# Patient Record
Sex: FEMALE | Race: WHITE
[De-identification: names, ages, dates, MRNs, and addresses within clinical notes are randomized per-mention and may not be internally consistent; named-entity substitution may affect disease eponyms.]

---

## 2017-06-18 ENCOUNTER — HOSPITAL ENCOUNTER (EMERGENCY)
Dept: HOSPITAL 47 - EC | Age: 22
Discharge: HOME | End: 2017-06-18
Payer: COMMERCIAL

## 2017-06-18 VITALS
TEMPERATURE: 99.3 F | DIASTOLIC BLOOD PRESSURE: 85 MMHG | HEART RATE: 88 BPM | RESPIRATION RATE: 18 BRPM | SYSTOLIC BLOOD PRESSURE: 122 MMHG

## 2017-06-18 DIAGNOSIS — S90.32XA: Primary | ICD-10-CM

## 2017-06-18 DIAGNOSIS — Z87.891: ICD-10-CM
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
## 2017-06-18 DIAGNOSIS — Z91.011: ICD-10-CM

## 2017-06-18 PROCEDURE — 99283 EMERGENCY DEPT VISIT LOW MDM: CPT

## 2017-06-18 NOTE — XR
EXAM:

  XR Left Foot Complete, 3 or More Views

 

CLINICAL HISTORY:

  Reason: Pain

 

TECHNIQUE:

  Frontal, lateral, and oblique views of the left foot.

 

COMPARISON:

  No relevant prior studies available.

 

FINDINGS:

  Bones/joints:  Unremarkable.  No acute fracture.  No dislocation.

  Soft tissues:  Unremarkable.  No radiopaque foreign body.

 

IMPRESSION:     

  Normal left foot x-rays.

## 2017-06-18 NOTE — ED
Lower Extremity Injury HPI





- General


Chief Complaint: Extremity Injury, Lower


Stated Complaint: L foot injury


Time Seen by Provider: 17 21:42


Source: patient


Mode of arrival: wheelchair


Limitations: no limitations





- History of Present Illness


Initial Comments: 





's patient is a 22-year-old woman presenting to be a viral for left foot pain 

after she fell on the stairs and her foot struck the wall.


MD Complaint: foot injury


Onset/Timin


-: hour(s)


Injury: Foot: Left


Type of Injury: blunt


Place: home


Severity: moderate


Improves With: nothing


Worsens With: weight bearing


Context: fall


Associated Symptoms: snap/pop sensation, able to partially bear weight





- Related Data


 Previous Rx's











 Medication  Instructions  Recorded


 


Acetaminophen-Codeine 300-30mg 1 tab PO Q4H PRN #15 tablet 17





[Tylenol w/codeine #3]  


 


Ibuprofen [Motrin] 600 mg PO Q8HR PRN #20 tab 17











 Allergies











Allergy/AdvReac Type Severity Reaction Status Date / Time


 


milk AdvReac  Unknown Verified 16 10:19














Review of Systems


ROS Statement: 


Those systems with pertinent positive or pertinent negative responses have been 

documented in the HPI.





ROS Other: All systems not noted in ROS Statement are negative.


Constitutional: Denies: fever, chills


Respiratory: Denies: cough, dyspnea


Cardiovascular: Denies: chest pain, palpitations


Musculoskeletal: Reports: as per HPI, arthralgia


Skin: Denies: lesions


Neurological: Denies: weakness, numbness, paresthesias





Past Medical History


Past Medical History: Asthma


Additional Past Medical History / Comment(s): migraines


History of Any Multi-Drug Resistant Organisms: None Reported


Past Surgical History: Adenoidectomy, Tonsillectomy


Past Psychological History: Anxiety


Smoking Status: Former smoker


Past Alcohol Use History: Daily


Past Drug Use History: None Reported





General Exam


Limitations: no limitations


General appearance: alert, in no apparent distress


Extremities exam: Present: full ROM, tenderness, normal capillary refill, other 

(Patient has a very minimal amount of soft tissue swelling to the dorsum of the 

left forefoot over the fourth and fifth metatarsals.  There is no tenderness to 

palpation of the base of the fifth metatarsal.).  Absent: pedal edema, calf 

tenderness


Skin exam: Present: warm, dry, intact, normal color.  Absent: rash





Course


 Vital Signs











  17





  21:34


 


Temperature 99.3 F


 


Pulse Rate 88


 


Respiratory 18





Rate 


 


Blood Pressure 122/85


 


O2 Sat by Pulse 97





Oximetry 














Disposition


Clinical Impression: 


 Foot contusion





Disposition: HOME SELF-CARE


Condition: Good


Instructions:  Foot Contusion (ED)


Prescriptions: 


Acetaminophen-Codeine 300-30mg [Tylenol w/codeine #3] 1 tab PO Q4H PRN #15 

tablet


 PRN Reason: Pain


Ibuprofen [Motrin] 600 mg PO Q8HR PRN #20 tab


 PRN Reason: Pain


Referrals: 


None,Stated [Primary Care Provider] - 1-2 days

## 2018-07-24 ENCOUNTER — HOSPITAL ENCOUNTER (EMERGENCY)
Dept: HOSPITAL 47 - EC | Age: 23
Discharge: HOME | End: 2018-07-24
Payer: COMMERCIAL

## 2018-07-24 VITALS
HEART RATE: 74 BPM | RESPIRATION RATE: 18 BRPM | TEMPERATURE: 98.2 F | SYSTOLIC BLOOD PRESSURE: 119 MMHG | DIASTOLIC BLOOD PRESSURE: 72 MMHG

## 2018-07-24 DIAGNOSIS — R51: ICD-10-CM

## 2018-07-24 DIAGNOSIS — Z86.69: ICD-10-CM

## 2018-07-24 DIAGNOSIS — Z87.891: ICD-10-CM

## 2018-07-24 DIAGNOSIS — Z91.011: ICD-10-CM

## 2018-07-24 DIAGNOSIS — R11.2: Primary | ICD-10-CM

## 2018-07-24 LAB
ALBUMIN SERPL-MCNC: 4.3 G/DL (ref 3.5–5)
ALP SERPL-CCNC: 62 U/L (ref 38–126)
ALT SERPL-CCNC: 33 U/L (ref 9–52)
ANION GAP SERPL CALC-SCNC: 12 MMOL/L
AST SERPL-CCNC: 24 U/L (ref 14–36)
BASOPHILS # BLD AUTO: 0 K/UL (ref 0–0.2)
BASOPHILS NFR BLD AUTO: 0 %
BUN SERPL-SCNC: 5 MG/DL (ref 7–17)
CALCIUM SPEC-MCNC: 9.3 MG/DL (ref 8.4–10.2)
CHLORIDE SERPL-SCNC: 103 MMOL/L (ref 98–107)
CO2 SERPL-SCNC: 22 MMOL/L (ref 22–30)
EOSINOPHIL # BLD AUTO: 0.1 K/UL (ref 0–0.7)
EOSINOPHIL NFR BLD AUTO: 1 %
ERYTHROCYTE [DISTWIDTH] IN BLOOD BY AUTOMATED COUNT: 5.5 M/UL (ref 3.8–5.4)
ERYTHROCYTE [DISTWIDTH] IN BLOOD: 12.7 % (ref 11.5–15.5)
GLUCOSE SERPL-MCNC: 90 MG/DL (ref 74–99)
HCT VFR BLD AUTO: 50.7 % (ref 34–46)
HGB BLD-MCNC: 16.8 GM/DL (ref 11.4–16)
KETONES UR QL STRIP.AUTO: (no result)
LYMPHOCYTES # SPEC AUTO: 1.1 K/UL (ref 1–4.8)
LYMPHOCYTES NFR SPEC AUTO: 9 %
MCH RBC QN AUTO: 30.5 PG (ref 25–35)
MCHC RBC AUTO-ENTMCNC: 33.1 G/DL (ref 31–37)
MCV RBC AUTO: 92.2 FL (ref 80–100)
MONOCYTES # BLD AUTO: 0.7 K/UL (ref 0–1)
MONOCYTES NFR BLD AUTO: 6 %
NEUTROPHILS # BLD AUTO: 10.4 K/UL (ref 1.3–7.7)
NEUTROPHILS NFR BLD AUTO: 83 %
PH UR: 6.5 [PH] (ref 5–8)
PLATELET # BLD AUTO: 273 K/UL (ref 150–450)
POTASSIUM SERPL-SCNC: 4.1 MMOL/L (ref 3.5–5.1)
PROT SERPL-MCNC: 6.5 G/DL (ref 6.3–8.2)
SODIUM SERPL-SCNC: 137 MMOL/L (ref 137–145)
SP GR UR: 1.02 (ref 1–1.03)
UROBILINOGEN UR QL STRIP: <2 MG/DL (ref ?–2)
WBC # BLD AUTO: 12.5 K/UL (ref 3.8–10.6)

## 2018-07-24 PROCEDURE — 36415 COLL VENOUS BLD VENIPUNCTURE: CPT

## 2018-07-24 PROCEDURE — 96374 THER/PROPH/DIAG INJ IV PUSH: CPT

## 2018-07-24 PROCEDURE — 80053 COMPREHEN METABOLIC PANEL: CPT

## 2018-07-24 PROCEDURE — 85025 COMPLETE CBC W/AUTO DIFF WBC: CPT

## 2018-07-24 PROCEDURE — 81003 URINALYSIS AUTO W/O SCOPE: CPT

## 2018-07-24 PROCEDURE — 99284 EMERGENCY DEPT VISIT MOD MDM: CPT

## 2018-07-24 PROCEDURE — 96361 HYDRATE IV INFUSION ADD-ON: CPT

## 2018-07-24 PROCEDURE — 81025 URINE PREGNANCY TEST: CPT

## 2018-07-24 PROCEDURE — 96375 TX/PRO/DX INJ NEW DRUG ADDON: CPT

## 2019-03-20 NOTE — XR
EXAMINATION TYPE: XR chest 2V

 

DATE OF EXAM: 3/20/2019

 

COMPARISON: 11/19/2014

 

HISTORY: Chest pain

 

TECHNIQUE:  Frontal and lateral views of the chest are obtained.

 

FINDINGS:  

 

There is no focal air space opacity.

 

No evidence for pneumothorax.  No pleural effusion.

 

The cardiac silhouette size is within normal limits.

 

The osseous structures are grossly intact.

 

IMPRESSION:  

 

1.  No acute cardiopulmonary process.

## 2019-03-20 NOTE — ED
URI HPI





- General


Chief Complaint: Upper Respiratory Infection


Stated Complaint: Alcohol withdrawal


Time Seen by Provider: 03/20/19 10:42


Source: patient, RN notes reviewed


Mode of arrival: ambulatory


Limitations: no limitations





- History of Present Illness


Initial Comments: 





23-year-old female presents emergency Department with fever cough congestion.  

Patient states that she does not feel well.  Patient states has been present for

last 4-5 days.  Patient felt that she just had a cold and was seen at [facility] was sent here for further evaluation.  Patient states she mentioned to 

[facility] that she stop drinking 6 days ago he felt that she may be having 

withdrawal symptoms.  Patient states she has slight nausea though this is 

resolved no vomiting.  Denies any shaking.  Patient denies any chest pain or 

shortness of breath.





- Related Data


                                Home Medications











 Medication  Instructions  Recorded  Confirmed


 


Ascorbic Acid [Vitamin C] 1,000 mg PO DAILY 03/20/19 03/20/19


 


Aspirin-Acet-Caff [id number] 1 tab PO Q8H 03/20/19 03/20/19





[Excedrin]   


 


Ibuprofen [Motrin Ib] 200 mg PO Q8HR 03/20/19 03/20/19








                                  Previous Rx's











 Medication  Instructions  Recorded


 


Amoxicillin 875 mg PO Q12HR #20 tablet 03/20/19


 


LORazepam [Ativan] 1 mg PO TID 3 Days #9 tab 03/20/19











                                    Allergies











Allergy/AdvReac Type Severity Reaction Status Date / Time


 


milk AdvReac  Unknown Verified 03/20/19 10:09














Review of Systems


ROS Statement: 


Those systems with pertinent positive or pertinent negative responses have been 

documented in the HPI.





ROS Other: All systems not noted in ROS Statement are negative.





Past Medical History


Past Medical History: Asthma, GERD/Reflux


Additional Past Medical History / Comment(s): migraines, pneumomediastinum 2014.

  Pt has a history of drug and alcohol abuse since age 15, (whiskey, crystal 

meth, marijuana) pt went to rehab jan 2017, clean since that time per pt.


History of Any Multi-Drug Resistant Organisms: None Reported


Past Surgical History: Adenoidectomy, Tonsillectomy


Past Anesthesia/Blood Transfusion Reactions: No Reported Reaction, Motion 

Sickness


Past Psychological History: Anxiety, Panic Disorder


Smoking Status: Former smoker


Past Alcohol Use History: Abuse


Past Drug Use History: Marijuana





- Past Family History


  ** Father


Family Medical History: No Reported History


Additional Family Medical History / Comment(s): FATHER IS HEALTHY.





  ** Mother


History Unknown: Yes


Family Medical History: Unable to Obtain


Additional Family Medical History / Comment(s): PT STATES SHE DOES NOT KNOW HER 

MOTHERS HISTORY.





General Exam


Limitations: no limitations


General appearance: alert, in no apparent distress


Head exam: Present: atraumatic, normocephalic, normal inspection


Eye exam: Present: normal appearance, PERRL, EOMI.  Absent: scleral icterus, 

conjunctival injection, periorbital swelling


ENT exam: Present: mucous membranes moist, TM's normal bilaterally.  Absent: 

normal exam (Sinus tenderness), normal oropharynx (Postnasal drainage)


Neck exam: Present: normal inspection, full ROM.  Absent: tenderness, 

meningismus, lymphadenopathy


Respiratory exam: Present: normal lung sounds bilaterally.  Absent: respiratory 

distress, wheezes, rales, rhonchi, stridor


Cardiovascular Exam: Present: regular rate, normal rhythm, normal heart sounds. 

 Absent: systolic murmur, diastolic murmur, rubs, gallop, clicks


GI/Abdominal exam: Present: soft, normal bowel sounds.  Absent: distended, 

tenderness, guarding, rebound, rigid





Course





                                   Vital Signs











  03/20/19 03/20/19





  09:56 10:17


 


Temperature 97.9 F 99.3 F


 


Pulse Rate 101 H 


 


Respiratory 18 





Rate  


 


Blood Pressure 122/86 


 


O2 Sat by Pulse 92 L 





Oximetry  














Medical Decision Making





- Medical Decision Making





23-year-old female presented for URI symptoms.  Patient has negative influenza, 

chest x-ray unremarkable.  Patient we treated for acute sinusitis.  Patient is 

concerned that he told her she could be in withdrawal.  This is less likely 

given that she has been drinking alcoholic he states that she's only been 

drinking 3 a day and has not drank in 6 days.  Patient will be given Ativan for 

2 days and she is advised to follow-up.





- Lab Data





                                   Lab Results











  03/20/19 Range/Units





  10:30 


 


Influenza Type A RNA  Not Detected  (Not Detectd)  


 


Influenza Type B (PCR)  Not Detected  (Not Detectd)  














Disposition


Clinical Impression: 


 Sinusitis, History of alcohol abuse





Disposition: HOME SELF-CARE


Condition: Stable


Instructions (If sedation given, give patient instructions):  Upper Respiratory 

Infection (ED)


Additional Instructions: 


Please return to the Emergency Department if symptoms worsen or any other 

concerns.


Prescriptions: 


Amoxicillin 875 mg PO Q12HR #20 tablet


LORazepam [Ativan] 1 mg PO TID 3 Days #9 tab


Is patient prescribed a controlled substance at d/c from ED?: No


Referrals: 


George Lackey MD [Primary Care Provider] - 1-2 days


Time of Disposition: 11:39

## 2019-04-25 NOTE — XR
EXAMINATION TYPE: XR chest 2V

 

DATE OF EXAM: 4/25/2019

 

COMPARISON: 3/20/2019

 

HISTORY: 24-year-old female with pain

 

TECHNIQUE:  PA and lateral views

 

FINDINGS:  

The cardiomediastinal silhouette, aorta, and pulmonary vasculature are within normal limits. Lungs an
d pleural spaces are clear.

 

 

IMPRESSION:  

No acute cardiopulmonary process.

## 2019-04-25 NOTE — ED
General Adult HPI





- General


Chief complaint: Weakness


Stated complaint: near syncope/facial & hand tingling


Time Seen by Provider: 04/25/19 14:05


Source: patient, RN notes reviewed, old records reviewed


Mode of arrival: wheelchair


Limitations: no limitations





- History of Present Illness


Initial comments: 


24-year-old female patient with past history prior alcohol use disorder, states 

that she is not currently abusing alcohol presents to ED with primary complaint 

of approximately 12 days of waxing and waning paresthesias at tips of fingers as

well as at lips.  Patient denies any syncope, patient has any trauma to head or 

neck.  Patient also has a secondary complaint of sensation of food being stuck 

in throat, patient has treated been seen for this, patient has scheduled 

outpatient endoscopy tomorrow.  Pt denies any difficulty breathing, patient den

ies any headache, changes in vision.  Patient has any chest pain, shortness of 

breath, abdominal pain.





Systemic: Pt denies fatigue, myalgia, fever/chills, rash. Pt denies weakness, 

night sweats, weight loss. 


Neuro: Pt denies headache, visual disturbances, syncope or pre-syncope.


HEENT: Pt denies ocular discharge or irritation, otalgia, rhinorrhea, 

pharyngitis or notable lymphadenopathy. 


Cardiopulmonary: Pt denies chest pain, SOB, heart palpitations, dyspnea on 

exertion.  


Abdominal/GI: Pt denies abdominal pain, n/v/d. 


: Pt denies dysuria, burning w/ urination, frequency/urgency. Denies new onset

urinary or bowel incontinence.  


MSK: Pt denies myalgia, loss of strength or function in extremities. 


Neuro: Pt denies new onset weakness. 








- Related Data


                                Home Medications











 Medication  Instructions  Recorded  Confirmed


 


No Known Home Medications  04/25/19 04/25/19











                                    Allergies











Allergy/AdvReac Type Severity Reaction Status Date / Time


 


milk AdvReac  Unknown Verified 04/25/19 14:02














Review of Systems


ROS Statement: 


Those systems with pertinent positive or pertinent negative responses have been 

documented in the HPI.





ROS Other: All systems not noted in ROS Statement are negative.





Past Medical History


Past Medical History: Asthma, GERD/Reflux, No Reported History


Additional Past Medical History / Comment(s): migraines, pneumomediastinum 2014.

 Pt has a history of drug and alcohol abuse since age 15, (whiskey, crystal 

meth, marijuana) pt went to rehab jan 2017, clean since that time per pt.


History of Any Multi-Drug Resistant Organisms: None Reported


Past Surgical History: Adenoidectomy, No Surgical Hx Reported, Tonsillectomy


Past Anesthesia/Blood Transfusion Reactions: Motion Sickness, No Reported 

Reaction


Past Psychological History: Anxiety, No Psychological Hx Reported, Panic 

Disorder


Smoking Status: Never smoker


Past Alcohol Use History: Abuse, None Reported


Past Drug Use History: Marijuana





- Past Family History


  ** Father


Family Medical History: No Reported History


Additional Family Medical History / Comment(s): FATHER IS HEALTHY.





  ** Mother


History Unknown: Yes


Family Medical History: Unable to Obtain


Additional Family Medical History / Comment(s): PT STATES SHE DOES NOT KNOW HER 

MOTHERS HISTORY.





General Exam





- General Exam Comments


Initial Comments: 





Constitutional: NAD, AOX3, Pt has pleasant affect. 


HEENT: NC/AT, trachea midline, neck supple, no lymphadenopathy. Posterior 

pharynx non erythematous, without exudates. External ears appear normal, without

discharge. Mucous membranes moist. Eyes PERRLA, EOM intact. There is no scleral 

icterus. No pallor noted. 


Cardiopulmonary: RRR, no murmurs, rubs or gallops, no JVD noted. Lungs CTAB in 

anterior and posterior fields. No peripheral edema. 


Abdominal exam: Abdomen soft and non-distended. Abdomen non-tender to palpation 

in all 4 quadrants. Bowel sounds active in LLQ. No hepatosplenomegaly. No 

ecchymosis


Neuro: CN II-XII intact. No nuchal rigidity. 


MSK: No posterior calf tenderness bilaterally, homans sign negative bilaterally.

Posterior tibialis and radial pulse +2 bilaterally. Sensation intact in upper 

and lower extremities. Full active ROM in upper and lower extremities, 5/5 stre

gnth. 








Limitations: no limitations





Course





                                   Vital Signs











  04/25/19 04/25/19 04/25/19





  13:35 15:00 15:30


 


Temperature 98.5 F  


 


Pulse Rate 111 H 79 80


 


Respiratory 20 18 19





Rate   


 


Blood Pressure 138/81 124/78 123/71


 


O2 Sat by Pulse 99 98 100





Oximetry   














Medical Decision Making





- Medical Decision Making





24-year-old female patient with past history prior alcohol use disorder, states 

that she is not currently abusing alcohol presents to ED with primary complaint 

of approximately 12 days of waxing and waning paresthesias at tips of fingers as

well as at lips.  Patient denies any syncope, patient has any trauma to head or 

neck.  Patient also has a secondary complaint of sensation of food being stuck 

in throat, patient has treated been seen for this, patient has scheduled 

outpatient endoscopy tomorrow.  Pt denies any difficulty breathing, patient 

denies any headache, changes in vision.  Patient has any chest pain, shortness 

of breath, abdominal pain.  Patient doesn't stable, afebrile.  Physical exam did

not acute pathology.  Neurologic exam within normal limits.  Laboratory 

investigations are non-impressive CBC.  UA displayed mild increased liver 

enzymes.  UA non-impressive.  EKG


For acute ischemia.  Chest revealed acute process.  Patient symptoms improved 

with administration of IV fluid. PT will be discharged. Pt will f/u with primary

care provider in 1-2 days.  Patient will attend scheduled endoscopy tomorrow.  

Patient will return to ER if condition worsens in anyway.  Case discussed with 

Dr. Serra. 











- Lab Data


Result diagrams: 


                                 04/25/19 14:17





                                 04/25/19 14:17





                                   Lab Results











  04/25/19 04/25/19 04/25/19 Range/Units





  14:17 14:17 14:17 


 


WBC  6.6    (3.8-10.6)  k/uL


 


RBC  4.67    (3.80-5.40)  m/uL


 


Hgb  14.3    (11.4-16.0)  gm/dL


 


Hct  42.8    (34.0-46.0)  %


 


MCV  91.6    (80.0-100.0)  fL


 


MCH  30.6    (25.0-35.0)  pg


 


MCHC  33.4    (31.0-37.0)  g/dL


 


RDW  13.1    (11.5-15.5)  %


 


Plt Count  322    (150-450)  k/uL


 


Neutrophils %  71    %


 


Lymphocytes %  22    %


 


Monocytes %  4    %


 


Eosinophils %  2    %


 


Basophils %  0    %


 


Neutrophils #  4.7    (1.3-7.7)  k/uL


 


Lymphocytes #  1.4    (1.0-4.8)  k/uL


 


Monocytes #  0.3    (0-1.0)  k/uL


 


Eosinophils #  0.1    (0-0.7)  k/uL


 


Basophils #  0.0    (0-0.2)  k/uL


 


Sodium   140   (137-145)  mmol/L


 


Potassium   4.5   (3.5-5.1)  mmol/L


 


Chloride   108 H   ()  mmol/L


 


Carbon Dioxide   21 L   (22-30)  mmol/L


 


Anion Gap   11   mmol/L


 


BUN   6 L   (7-17)  mg/dL


 


Creatinine   0.55   (0.52-1.04)  mg/dL


 


Est GFR (CKD-EPI)AfAm   >90   (>60 ml/min/1.73 sqM)  


 


Est GFR (CKD-EPI)NonAf   >90   (>60 ml/min/1.73 sqM)  


 


Glucose   93   (74-99)  mg/dL


 


Calcium   10.0   (8.4-10.2)  mg/dL


 


Total Bilirubin   0.7   (0.2-1.3)  mg/dL


 


AST   38 H   (14-36)  U/L


 


ALT   57 H   (9-52)  U/L


 


Alkaline Phosphatase   55   ()  U/L


 


Ammonia     (<30)  umol/L


 


Total Protein   8.1   (6.3-8.2)  g/dL


 


Albumin   5.2 H   (3.5-5.0)  g/dL


 


Urine Color     


 


Urine Appearance     (Clear)  


 


Urine pH     (5.0-8.0)  


 


Ur Specific Gravity     (1.001-1.035)  


 


Urine Protein     (Negative)  


 


Urine Glucose (UA)     (Negative)  


 


Urine Ketones     (Negative)  


 


Urine Blood     (Negative)  


 


Urine Nitrite     (Negative)  


 


Urine Bilirubin     (Negative)  


 


Urine Urobilinogen     (<2.0)  mg/dL


 


Ur Leukocyte Esterase     (Negative)  


 


Urine HCG, Qual    Not Detected  (Not Detectd)  














  04/25/19 04/25/19 Range/Units





  14:17 14:55 


 


WBC    (3.8-10.6)  k/uL


 


RBC    (3.80-5.40)  m/uL


 


Hgb    (11.4-16.0)  gm/dL


 


Hct    (34.0-46.0)  %


 


MCV    (80.0-100.0)  fL


 


MCH    (25.0-35.0)  pg


 


MCHC    (31.0-37.0)  g/dL


 


RDW    (11.5-15.5)  %


 


Plt Count    (150-450)  k/uL


 


Neutrophils %    %


 


Lymphocytes %    %


 


Monocytes %    %


 


Eosinophils %    %


 


Basophils %    %


 


Neutrophils #    (1.3-7.7)  k/uL


 


Lymphocytes #    (1.0-4.8)  k/uL


 


Monocytes #    (0-1.0)  k/uL


 


Eosinophils #    (0-0.7)  k/uL


 


Basophils #    (0-0.2)  k/uL


 


Sodium    (137-145)  mmol/L


 


Potassium    (3.5-5.1)  mmol/L


 


Chloride    ()  mmol/L


 


Carbon Dioxide    (22-30)  mmol/L


 


Anion Gap    mmol/L


 


BUN    (7-17)  mg/dL


 


Creatinine    (0.52-1.04)  mg/dL


 


Est GFR (CKD-EPI)AfAm    (>60 ml/min/1.73 sqM)  


 


Est GFR (CKD-EPI)NonAf    (>60 ml/min/1.73 sqM)  


 


Glucose    (74-99)  mg/dL


 


Calcium    (8.4-10.2)  mg/dL


 


Total Bilirubin    (0.2-1.3)  mg/dL


 


AST    (14-36)  U/L


 


ALT    (9-52)  U/L


 


Alkaline Phosphatase    ()  U/L


 


Ammonia   <9  (<30)  umol/L


 


Total Protein    (6.3-8.2)  g/dL


 


Albumin    (3.5-5.0)  g/dL


 


Urine Color  Light Yellow   


 


Urine Appearance  Clear   (Clear)  


 


Urine pH  7.0   (5.0-8.0)  


 


Ur Specific Gravity  1.006   (1.001-1.035)  


 


Urine Protein  Negative   (Negative)  


 


Urine Glucose (UA)  Negative   (Negative)  


 


Urine Ketones  1+ H   (Negative)  


 


Urine Blood  Negative   (Negative)  


 


Urine Nitrite  Negative   (Negative)  


 


Urine Bilirubin  Negative   (Negative)  


 


Urine Urobilinogen  <2.0   (<2.0)  mg/dL


 


Ur Leukocyte Esterase  Negative   (Negative)  


 


Urine HCG, Qual    (Not Detectd)  














- EKG Data


-: EKG Interpreted by Me (and dr serra)


EKG Comments: 


Ventricular rate 85, WV interval 138, QRS 84, QT//47.  Sinus rhythm with 

arrhythmia.  No concern for acute ischemia.








Disposition


Clinical Impression: 


 Paresthesia





Disposition: HOME SELF-CARE


Condition: Stable


Instructions (If sedation given, give patient instructions):  Paresthesia (ED)


Additional Instructions: 


Patient to adhere to previously discussed treatment plan and will take 

medication(s) as directed. Patient to follow up with PCP in 1-2 days. Patient to

return to ED if symptoms do not improve. 





Please follow-up with primary care provider in 1-2 days.  Return to ER if 

condition worsens.


Is patient prescribed a controlled substance at d/c from ED?: No


Referrals: 


George Lackey MD [Primary Care Provider] - 1-2 days

## 2020-06-11 NOTE — USB
Reason for exam: clinical finding.



History:

Patient is nulliparous.

Took hormonal contraceptives for 4 years beginning at age 15.



Physical Findings:

Nurse Summary: 2cm nodule in the right breast at 10 o'clock and a 1.5cm nodule in 

the left breast at 10 o'clock (nurse dw).



US Breast BILAT

Right complete breast ultrasound includes all four quadrants, the retroareolar 

region and axilla. Finding demonstrates no cystic or solid lesion seen.



Left complete breast ultrasound includes all four quadrants, the retroareolar 

region and axilla. Finding demonstrates no cystic or solid lesion seen.



Dense tissue seen throughout.



These results were verbally communicated with the patient and result sheet given 

to the patient on 6/9/20.





ASSESSMENT: Negative, BI-RAD 1



RECOMMENDATION:

Routine screening mammogram of both breasts at age 40.

(or sooner if clinically indicated)

Manage on a clinical basis with regard to bilateral yellow nipple discharge.

## 2020-07-29 NOTE — XR
EXAMINATION TYPE: XR chest 2V

 

DATE OF EXAM: 7/29/2020

 

COMPARISON: Prior chest x-ray 4/25/2019

 

HISTORY: Palpable lump left lateral side, R 22.2, Z 87.81

 

TECHNIQUE:  Frontal and lateral views of the chest are obtained.

 

FINDINGS:  There is no focal air space opacity, pleural effusion, or pneumothorax seen.  The cardiac 
silhouette size is within normal limits.   The osseous structures are intact.

 

IMPRESSION:  No acute cardiopulmonary process. Consider alternate imaging.

## 2020-08-05 NOTE — US
EXAMINATION TYPE: US mass soft tissue chest/back

 

DATE OF EXAM: 8/4/2020

 

COMPARISON: XR

 

CLINICAL HISTORY: R22.2 Localized swelling, mass and lump, trunk. Localized swelling, mass and lump o
n patient's left side.

 

Scanned left upper quadrant near intercostal area at patient's area of concern. 

 

No abnormalities seen at this time by ultrasound.

 

IMPRESSION:  

1. No suspicious cystic or solid lesions identified at the palpable region. If additional evaluation 
is required, CT could be performed.

## 2020-08-10 NOTE — CT
EXAMINATION TYPE: CT chest w con

 

DATE OF EXAM: 8/10/2020

 

COMPARISON: CT chest November 19, 2014

 

HISTORY: Left lower rib pain per patient. Palpable mass or lump per order.

 

CT DLP: 131 mGycm.   Automated Exposure Control for Dose Reduction was Utilized.

 

 

TECHNIQUE:  CT scan of the thorax is performed following with IV Contrast, patient injected with 100 
mL of Isovue 300.

 

FINDINGS:

 

LUNGS: The lungs remain grossly clear, there is no concerning new parenchymal mass or nodule identifi
ed. No suspicious focal consolidation.   There is no pleural effusion or pneumothorax seen bilaterall
y.  The tracheobronchial tree is patent.

 

MEDIASTINUM: There are no greater than 1 cm hilar or mediastinal lymph nodes.   No cardiomegaly or pe
ricardial effusion is seen.  

 

OTHER: Visualized osseous structures are intact with particular attention to the left lower ribs that
 area of clinical concern. Extremely dense fibroglandular tissue in both breasts is noted, age approp
riate.

 

IMPRESSION: No suspicious mass or fluid collection. No acute pulmonary process.

## 2020-11-08 NOTE — US
EXAMINATION TYPE: Transabdominal

 

DATE OF EXAM: 2020 4:39 PM

 

COMPARISON: NONE

 

CLINICAL HISTORY: spotting. Vaginal spotting x 1 day; ; HX of ovarian cysts per patient.

 

EXAM PERFORMED:  Transabdominal (TA)

 

EXAM MEASUREMENTS:

 

GESTATIONAL AGE / DATING

 

Physician Established: Not yet established 

Dates by LMP: (6 weeks/4 days)  

** EDC: 2021

Dates by First Scan:  No previous 

Dates by Current Scan for:  (6 weeks/1 day)  

** EDC: 2021

 

MATERNAL ANATOMY 

 

Uterus: 10.6 x 6.4 x 4.9cm;l uterine fibroid seen right lateral myometrium = 1.0 x 0.7 x 0.8cm.

Right Ovary: 2.4 x 3.5 x 1.1cm

Left Ovary: 7.7 x 6.2 x 5.1cm

Post CDS / Adnexa: wnl

Presence of free fluid: no

Presence of corpus luteal cyst: in enlarged left ovary as simple cyst = 3.7 x 5.7 x 4.5cm 

Presence of subchorionic bleed: yes, superior right gestational sac a hypoechoic area is seen and siz
e = 0.4 x 0.4 x 0.4cm. 

 

GESTATION / FETAL SURVEY

 

CRL: 0.4cm (6 weeks/1 day)

Yolk Sac (normal less than 6mm): 3.1mm

Heart Rate: 120 bpm

Rhythm:  Normal

IUP:  Single, IUP

 

Date of LMP: 2020

Beta HcG (if available):  NA

 

Single,live IUP, 6 weeks/1 day, EDC: 2021,[id number]; uterine fibroid is noted; presence of subch
orionic bleed seen superior right gestational sac as hypoechoic area with size = 0.4 x 0.4 x 0.4cm; e
nlarged left ovary , but color flow is seen on periphery of ovary.

 

 

 

 

 

 

IMPRESSION:

 

The ultrasound gestational age is 6 weeks and 1 day. Large uterine fibroid noted. Possible tiny subch
orionic hemorrhage.

## 2020-11-08 NOTE — ED
Female Urogenital HPI





- General


Chief complaint: Vaginal Bleeding


Stated complaint: Vaginal Bleeding,6 weeks preg


Time Seen by Provider: 11/08/20 15:32


Source: patient


Mode of arrival: ambulatory





- History of Present Illness


Initial comments: 


26yo female presenting for spotting in pregnancy, statse she was spotting nad 

cramping yesterday. Very light. Resolved today, no current pain. Patient denies 

additional symptoms. Patient concerned about baby and presented to the ER. Derek alejandro has no additional complaints. Last period in September. OBGYN Dr. Duggan.





Last Menstrual Period: 09/23/20





- Related Data


                                Home Medications











 Medication  Instructions  Recorded  Confirmed


 


No Known Home Medications  04/25/19 04/25/19











                                    Allergies











Allergy/AdvReac Type Severity Reaction Status Date / Time


 


milk AdvReac  Unknown Verified 11/08/20 15:19














Review of Systems


ROS Statement: 


Those systems with pertinent positive or pertinent negative responses have been 

documented in the HPI.





ROS Other: All systems not noted in ROS Statement are negative.





Past Medical History


Past Medical History: Asthma, GERD/Reflux, No Reported History


Additional Past Medical History / Comment(s): migraines, pneumomediastinum 2014.

 Pt has a history of drug and alcohol abuse since age 15, (whiskey, crystal 

meth, marijuana) pt went to rehab jan 2017, clean since that time per pt.


History of Any Multi-Drug Resistant Organisms: None Reported


Past Surgical History: Adenoidectomy, No Surgical Hx Reported, Tonsillectomy


Past Anesthesia/Blood Transfusion Reactions: Motion Sickness, No Reported 

Reaction


Past Psychological History: Anxiety, No Psychological Hx Reported, Panic 

Disorder


Smoking Status: Former smoker


Past Alcohol Use History: Abuse, None Reported


Past Drug Use History: Marijuana





- Past Family History


  ** Father


Family Medical History: No Reported History


Additional Family Medical History / Comment(s): FATHER IS HEALTHY.





  ** Mother


History Unknown: Yes


Family Medical History: Unable to Obtain


Additional Family Medical History / Comment(s): PT STATES SHE DOES NOT KNOW HER 

MOTHERS HISTORY.





General Exam





- General Exam Comments


Initial Comments: 


General:  The patient is awake and alert, in no distress


Eye:  Pupils are equal, round and reactive to light, extra-ocular movements are 

intact.  No nystagmus.  There is normal conjunctiva bilaterally.  No signs of 

icterus.  


Cardiovascular:  There is a regular rate and rhythm. No murmur, rub or gallop is

appreciated.


Respiratory:  Lungs are clear to auscultation, respirations are non-labored, 

breath sounds are equal.  No wheezes, stridor, rales, or rhonchi.


Gastrointestinal:  Soft, non-distended, non-tender abdomen without masses or 

organomegaly noted. There is no rebound or guarding present. 


Musculoskeletal:  Normal ROM, no tenderness.  Strength 5/5. Sensation intact. 

Pulses equal bilaterally 2+.  


Neurological:  A&O x 3. CN II-XII intact grossly, There are no obvious motor or 

sensory deficits. Coordination appears grossly intact. Speech is normal.


Skin:  Skin is warm and dry and no rashes or lesions are noted. 


Psychiatric:  Cooperative, appropriate mood & affect, normal judgment.  








Course


                                   Vital Signs











  11/08/20 11/08/20





  15:16 17:56


 


Temperature 98 F 98.1 F


 


Pulse Rate 73 60


 


Respiratory 18 16





Rate  


 


Blood Pressure 114/72 129/81


 


O2 Sat by Pulse 100 100





Oximetry  














Medical Decision Making





- Medical Decision Making


Labs stable. US IUP.  Subchorionic hemorrhage. Patient has no current bleeding 

or pain. Ovarian lesion discussed. Recommend OBGYN f/u. Pateint agreeable 

discharged appearing well. No Rhogam indicated. Discussed case with Dr Coats








- Lab Data


Result diagrams: 


                                 11/08/20 15:48





                                   Lab Results











  11/08/20 11/08/20 11/08/20 Range/Units





  15:48 15:48 15:48 


 


WBC    13.5 H  (3.8-10.6)  k/uL


 


RBC    5.04  (3.80-5.40)  m/uL


 


Hgb    15.4  (11.4-16.0)  gm/dL


 


Hct    47.1 H  (34.0-46.0)  %


 


MCV    93.4  (80.0-100.0)  fL


 


MCH    30.5  (25.0-35.0)  pg


 


MCHC    32.7  (31.0-37.0)  g/dL


 


RDW    12.3  (11.5-15.5)  %


 


Plt Count    308  (150-450)  k/uL


 


Neutrophils %    74  %


 


Lymphocytes %    18  %


 


Monocytes %    5  %


 


Eosinophils %    2  %


 


Basophils %    0  %


 


Neutrophils #    10.0 H  (1.3-7.7)  k/uL


 


Lymphocytes #    2.4  (1.0-4.8)  k/uL


 


Monocytes #    0.7  (0-1.0)  k/uL


 


Eosinophils #    0.2  (0-0.7)  k/uL


 


Basophils #    0.1  (0-0.2)  k/uL


 


HCG, Quant   15069.2   mIU/mL


 


Urine Color     


 


Urine Appearance     (Clear)  


 


Urine pH     (5.0-8.0)  


 


Ur Specific Gravity     (1.001-1.035)  


 


Urine Protein     (Negative)  


 


Urine Glucose (UA)     (Negative)  


 


Urine Ketones     (Negative)  


 


Urine Blood     (Negative)  


 


Urine Nitrite     (Negative)  


 


Urine Bilirubin     (Negative)  


 


Urine Urobilinogen     (<2.0)  mg/dL


 


Ur Leukocyte Esterase     (Negative)  


 


Urine WBC     (0-5)  /hpf


 


Ur Squamous Epith Cells     (0-4)  /hpf


 


Amorphous Sediment     (None)  /hpf


 


Urine Bacteria     (None)  /hpf


 


Blood Type  B Positive    


 


Blood Type Recheck  No Previous Record    


 


Bld Type Recheck Status  [facility] ONLY    














  11/08/20 Range/Units





  15:48 


 


WBC   (3.8-10.6)  k/uL


 


RBC   (3.80-5.40)  m/uL


 


Hgb   (11.4-16.0)  gm/dL


 


Hct   (34.0-46.0)  %


 


MCV   (80.0-100.0)  fL


 


MCH   (25.0-35.0)  pg


 


MCHC   (31.0-37.0)  g/dL


 


RDW   (11.5-15.5)  %


 


Plt Count   (150-450)  k/uL


 


Neutrophils %   %


 


Lymphocytes %   %


 


Monocytes %   %


 


Eosinophils %   %


 


Basophils %   %


 


Neutrophils #   (1.3-7.7)  k/uL


 


Lymphocytes #   (1.0-4.8)  k/uL


 


Monocytes #   (0-1.0)  k/uL


 


Eosinophils #   (0-0.7)  k/uL


 


Basophils #   (0-0.2)  k/uL


 


HCG, Quant   mIU/mL


 


Urine Color  Yellow  


 


Urine Appearance  Cloudy H  (Clear)  


 


Urine pH  7.0  (5.0-8.0)  


 


Ur Specific Gravity  1.007  (1.001-1.035)  


 


Urine Protein  Negative  (Negative)  


 


Urine Glucose (UA)  Negative  (Negative)  


 


Urine Ketones  Negative  (Negative)  


 


Urine Blood  Negative  (Negative)  


 


Urine Nitrite  Negative  (Negative)  


 


Urine Bilirubin  Negative  (Negative)  


 


Urine Urobilinogen  <2.0  (<2.0)  mg/dL


 


Ur Leukocyte Esterase  Small H  (Negative)  


 


Urine WBC  1  (0-5)  /hpf


 


Ur Squamous Epith Cells  1  (0-4)  /hpf


 


Amorphous Sediment  Rare H  (None)  /hpf


 


Urine Bacteria  Rare H  (None)  /hpf


 


Blood Type   


 


Blood Type Recheck   


 


Bld Type Recheck Status   














Disposition


Clinical Impression: 


 Vaginal bleeding in pregnancy, Subchorionic bleed





Disposition: HOME SELF-CARE


Condition: Good


Instructions (If sedation given, give patient instructions):  Subchorionic 

Hemorrhage (ED)


Additional Instructions: 


Please use medication as discussed.  Please follow-up with family doctor in the 

next 2 days.  Please return to emergency room if the symptoms increase or worsen

or for any other concerns.


Is patient prescribed a controlled substance at d/c from ED?: No


Referrals: 


Anila Arenas MD [Primary Care Provider] - 1-2 days


Luis Alberto Duggan DO [REFERRING] - 1-2 days


Time of Disposition: 17:17

## 2021-04-24 NOTE — ED
General Adult HPI





- General


Chief complaint: Shortness of Breath


Stated complaint: Headache,ANDRES,Nausea,30wks preg


Time Seen by Provider: 21 13:45


Source: patient


Mode of arrival: wheelchair


Limitations: no limitations





- History of Present Illness


Initial comments: 


26-year-old female, 30 weeks pregnant, , with history of headaches 

presented to the emergency room with a chief complaint of a headache and 

shortness of breath.  Patient reports an ongoing headache for the past 3 days.  

She reports photophobia and nausea but denies any vomiting.  Patient also 

reports that earlier today she's noticed some shortness of breath while she was 

talking on the phone.  However, she denies any chest pain.  Patient is concerned

for open but denies any cough or any URI-like symptoms.  She denies any fevers 

or chills.  Denies any abdominal pain, cramping, urinary or vaginal symptoms.  

She denies any blurry vision, one-sided weakness or paresthesias.








- Related Data


                                Home Medications











 Medication  Instructions  Recorded  Confirmed


 


No Known Home Medications  19











                                    Allergies











Allergy/AdvReac Type Severity Reaction Status Date / Time


 


milk AdvReac  Unknown Verified 21 13:16














Review of Systems


ROS Statement: 


Those systems with pertinent positive or pertinent negative responses have been 

documented in the HPI.





ROS Other: All systems not noted in ROS Statement are negative.





Past Medical History


Past Medical History: Asthma, GERD/Reflux


Additional Past Medical History / Comment(s): migraines, pneumomediastinum .

 Pt has a history of drug and alcohol abuse since age 15, (whiskey, crystal 

meth, marijuana) pt went to rehab 2017, clean since that time per pt.


History of Any Multi-Drug Resistant Organisms: None Reported


Past Surgical History: Adenoidectomy, Tonsillectomy


Past Anesthesia/Blood Transfusion Reactions: Motion Sickness, No Reported React

ion


Past Psychological History: Anxiety, Panic Disorder


Smoking Status: Former smoker


Past Alcohol Use History: Abuse, None Reported


Past Drug Use History: Marijuana





- Past Family History


  ** Father


Family Medical History: No Reported History


Additional Family Medical History / Comment(s): FATHER IS HEALTHY.





  ** Mother


History Unknown: Yes


Family Medical History: Unable to Obtain


Additional Family Medical History / Comment(s): PT STATES SHE DOES NOT KNOW HER 

MOTHERS HISTORY.





General Exam


Limitations: no limitations


General appearance: alert, in no apparent distress


Head exam: Present: atraumatic, normocephalic, normal inspection


Eye exam: Present: normal appearance, PERRL, EOMI


Pupils: Present: normal accommodation


ENT exam: Present: normal exam, normal oropharynx, mucous membranes moist, TM's 

normal bilaterally, normal external ear exam


Neck exam: Present: normal inspection, full ROM.  Absent: tenderness


Respiratory exam: Present: normal lung sounds bilaterally.  Absent: respiratory 

distress, wheezes, rales, rhonchi, stridor, chest wall tenderness, accessory 

muscle use


Cardiovascular Exam: Present: regular rate, normal rhythm, normal heart sounds


GI/Abdominal exam: Present: soft.  Absent: distended, tenderness, guarding, 

rebound


Extremities exam: Present: normal inspection, full ROM, normal capillary refill.

 Absent: tenderness, pedal edema, joint swelling


Back exam: Present: normal inspection, full ROM.  Absent: tenderness, CVA 

tenderness (R), CVA tenderness (L)


Neurological exam: Present: alert, oriented X3


Psychiatric exam: Present: normal affect, normal mood


Skin exam: Present: warm, dry, intact, normal color





Course


                                   Vital Signs











  21





  13:12 14:30 14:32


 


Temperature 98.6 F  


 


Pulse Rate 84 87 


 


Respiratory 22 18 18





Rate   


 


Blood Pressure 117/75 120/79 


 


O2 Sat by Pulse 99 98 





Oximetry   














  21





  15:59 16:34


 


Temperature  98.6 F


 


Pulse Rate 82 77


 


Respiratory 18 18





Rate  


 


Blood Pressure 112/62 112/72


 


O2 Sat by Pulse 100 99





Oximetry  














EKG Findings





- EKG Comments:


EKG Findings:: Sinus rhythm and no acute ischemic changes.  Ventricular rate 72,

, QRS 84, QTc 422.





Medical Decision Making





- Medical Decision Making


26-year-old female presents to emergency with a chief complaint of a headache 

and shortness of breath.  She does have history of headaches.  She has not been 

taking medication home.  Patient was given Tylenol and IV fluids in emergency 

department.  The rest of the physical exam is unremarkable.  No chest pain 

vaginal bleeding or abdominal pain.  CBC shows mild leukocytosis of 11.6, likely

reactive secondary to her pregnancy.  CMP is unremarkable.  UA shows no signs of

urinary tract infection.  She is told with negative.  EKG showing sinus rhythm. 

Vital signs are within normal limits.  No concern for preeclampsia at this time.

 I did offer a chest x-ray, she declined.  I did discuss the possibility from 

pulmonary embolism and CT imaging for a rule out, she declined.  Patient states 

she would like to follow-up with her OB.  On reevaluation, she reports the heada

ivonne has improved.  Return parameters were discussed with patient was worsening 

and agreeable.  Case discussed with Dr. Mena.





- Lab Data


Result diagrams: 


                                 21 14:22





                                 21 14:22


                                   Lab Results











  21 Range/Units





  14:22 14:22 14:22 


 


WBC  11.8 H    (3.8-10.6)  k/uL


 


RBC  3.87    (3.80-5.40)  m/uL


 


Hgb  12.5    (11.4-16.0)  gm/dL


 


Hct  35.3    (34.0-46.0)  %


 


MCV  91.4    (80.0-100.0)  fL


 


MCH  32.2    (25.0-35.0)  pg


 


MCHC  35.3    (31.0-37.0)  g/dL


 


RDW  12.1    (11.5-15.5)  %


 


Plt Count  203    (150-450)  k/uL


 


MPV  7.5    


 


Neutrophils %  77    %


 


Lymphocytes %  15    %


 


Monocytes %  5    %


 


Eosinophils %  2    %


 


Basophils %  0    %


 


Neutrophils #  9.1 H    (1.3-7.7)  k/uL


 


Lymphocytes #  1.8    (1.0-4.8)  k/uL


 


Monocytes #  0.6    (0-1.0)  k/uL


 


Eosinophils #  0.2    (0-0.7)  k/uL


 


Basophils #  0.0    (0-0.2)  k/uL


 


Sodium   133 L   (137-145)  mmol/L


 


Potassium   3.8   (3.5-5.1)  mmol/L


 


Chloride   104   ()  mmol/L


 


Carbon Dioxide   24   (22-30)  mmol/L


 


Anion Gap   5   mmol/L


 


BUN   5 L   (7-17)  mg/dL


 


Creatinine   0.43 L   (0.52-1.04)  mg/dL


 


Est GFR (CKD-EPI)AfAm   >90   (>60 ml/min/1.73 sqM)  


 


Est GFR (CKD-EPI)NonAf   >90   (>60 ml/min/1.73 sqM)  


 


Glucose   84   (74-99)  mg/dL


 


Calcium   9.1   (8.4-10.2)  mg/dL


 


Total Bilirubin   0.3   (0.2-1.3)  mg/dL


 


AST   20   (14-36)  U/L


 


ALT   12   (4-34)  U/L


 


Alkaline Phosphatase   85   ()  U/L


 


Total Protein   6.0 L   (6.3-8.2)  g/dL


 


Albumin   3.4 L   (3.5-5.0)  g/dL


 


Urine Color    Light Yellow  


 


Urine Appearance    Clear  (Clear)  


 


Urine pH    8.0  (5.0-8.0)  


 


Ur Specific Gravity    1.006  (1.001-1.035)  


 


Urine Protein    Negative  (Negative)  


 


Urine Glucose (UA)    Negative  (Negative)  


 


Urine Ketones    Negative  (Negative)  


 


Urine Blood    Negative  (Negative)  


 


Urine Nitrite    Negative  (Negative)  


 


Urine Bilirubin    Negative  (Negative)  


 


Urine Urobilinogen    <2.0  (<2.0)  mg/dL


 


Ur Leukocyte Esterase    Small H  (Negative)  


 


Urine WBC    2  (0-5)  /hpf


 


Ur Squamous Epith Cells    <1  (0-4)  /hpf


 


Urine Bacteria    Rare H  (None)  /hpf


 


Coronavirus (PCR)     (Not Detectd)  














  21 Range/Units





  14:22 


 


WBC   (3.8-10.6)  k/uL


 


RBC   (3.80-5.40)  m/uL


 


Hgb   (11.4-16.0)  gm/dL


 


Hct   (34.0-46.0)  %


 


MCV   (80.0-100.0)  fL


 


MCH   (25.0-35.0)  pg


 


MCHC   (31.0-37.0)  g/dL


 


RDW   (11.5-15.5)  %


 


Plt Count   (150-450)  k/uL


 


MPV   


 


Neutrophils %   %


 


Lymphocytes %   %


 


Monocytes %   %


 


Eosinophils %   %


 


Basophils %   %


 


Neutrophils #   (1.3-7.7)  k/uL


 


Lymphocytes #   (1.0-4.8)  k/uL


 


Monocytes #   (0-1.0)  k/uL


 


Eosinophils #   (0-0.7)  k/uL


 


Basophils #   (0-0.2)  k/uL


 


Sodium   (137-145)  mmol/L


 


Potassium   (3.5-5.1)  mmol/L


 


Chloride   ()  mmol/L


 


Carbon Dioxide   (22-30)  mmol/L


 


Anion Gap   mmol/L


 


BUN   (7-17)  mg/dL


 


Creatinine   (0.52-1.04)  mg/dL


 


Est GFR (CKD-EPI)AfAm   (>60 ml/min/1.73 sqM)  


 


Est GFR (CKD-EPI)NonAf   (>60 ml/min/1.73 sqM)  


 


Glucose   (74-99)  mg/dL


 


Calcium   (8.4-10.2)  mg/dL


 


Total Bilirubin   (0.2-1.3)  mg/dL


 


AST   (14-36)  U/L


 


ALT   (4-34)  U/L


 


Alkaline Phosphatase   ()  U/L


 


Total Protein   (6.3-8.2)  g/dL


 


Albumin   (3.5-5.0)  g/dL


 


Urine Color   


 


Urine Appearance   (Clear)  


 


Urine pH   (5.0-8.0)  


 


Ur Specific Gravity   (1.001-1.035)  


 


Urine Protein   (Negative)  


 


Urine Glucose (UA)   (Negative)  


 


Urine Ketones   (Negative)  


 


Urine Blood   (Negative)  


 


Urine Nitrite   (Negative)  


 


Urine Bilirubin   (Negative)  


 


Urine Urobilinogen   (<2.0)  mg/dL


 


Ur Leukocyte Esterase   (Negative)  


 


Urine WBC   (0-5)  /hpf


 


Ur Squamous Epith Cells   (0-4)  /hpf


 


Urine Bacteria   (None)  /hpf


 


Coronavirus (PCR)  Not Detected  (Not Detectd)  














Disposition


Clinical Impression: 


 Headache





Disposition: HOME SELF-CARE


Condition: Stable


Instructions (If sedation given, give patient instructions):  Migraine Headache 

(ED)


Additional Instructions: 


Follow-up with your OB.  Return to emergency department if symptoms worsen.  

Take Tylenol for pain.


Is patient prescribed a controlled substance at d/c from ED?: No


Referrals: 


Anila Arenas MD [Primary Care Provider] - 1-2 days


Time of Disposition: 16:28

## 2022-06-16 NOTE — XR
EXAMINATION TYPE: XR chest 2V

 

DATE OF EXAM: 6/16/2022

 

COMPARISON: 7/29/2020

 

HISTORY: 27 year-old female shortness of breath, difficulty breathing

 

TECHNIQUE:  PA and lateral views

 

FINDINGS:  

The cardiomediastinal silhouette, aorta, and pulmonary vasculature are within normal limits. Lungs an
d pleural spaces are clear. Right-sided nipple bar.

 

 

IMPRESSION:  

No acute cardiopulmonary process.

## 2022-06-16 NOTE — ED
General Adult HPI





- General


Chief complaint: Chest Pain


Stated complaint: Chest congestion


Time Seen by Provider: 06/16/22 13:25


Source: patient, RN notes reviewed, old records reviewed


Mode of arrival: ambulatory


Limitations: no limitations





- History of Present Illness


Initial comments: 





This is a 27-year-old female presents emergency department stating that she has 

some sharp chest pain to the right side of her chest when she takes a deep 

breath or she moves her arm.  Patient states currently sitting is still taking 

shallow but she has no pain.  Patient denies any recent fever chills or cough.  

Patient denies shortness of breath when I asked specifically whether she was 

short of breath or hurts to breathe and she said it hurts to breathe not that 

she feels like she can't get her breath.  Patient denies palpitations.  Patient 

states she hasn't felt sick recently.  Patient is a 22 pound baby that she's 

always lifting.  Patient is not on birth control.  Patient denies any leg 

swelling or calf tenderness. 





- Related Data


                                Home Medications











 Medication  Instructions  Recorded  Confirmed


 


No Known Home Medications  04/25/19 04/25/19











                                    Allergies











Allergy/AdvReac Type Severity Reaction Status Date / Time


 


milk AdvReac  Unknown Verified 04/24/21 13:16














Review of Systems


ROS Statement: 


Those systems with pertinent positive or pertinent negative responses have been 

documented in the HPI.





ROS Other: All systems not noted in ROS Statement are negative.





Past Medical History


Past Medical History: Asthma, GERD/Reflux


Additional Past Medical History / Comment(s): migraines, pneumomediastinum 2014.

 Pt has a history of drug and alcohol abuse since age 15, (whiskey, crystal 

meth, marijuana) pt went to rehab jan 2017, clean since that time per pt.


History of Any Multi-Drug Resistant Organisms: None Reported


Past Surgical History: Adenoidectomy, Tonsillectomy


Past Anesthesia/Blood Transfusion Reactions: Motion Sickness, No Reported 

Reaction


Past Psychological History: Anxiety, Panic Disorder


Smoking Status: Former smoker


Past Alcohol Use History: Abuse, None Reported


Past Drug Use History: Marijuana





- Past Family History


  ** Father


Family Medical History: No Reported History


Additional Family Medical History / Comment(s): FATHER IS HEALTHY.





  ** Mother


History Unknown: Yes


Family Medical History: Unable to Obtain


Additional Family Medical History / Comment(s): PT STATES SHE DOES NOT KNOW HER 

MOTHERS HISTORY.





General Exam





- General Exam Comments


Initial Comments: 





GENERAL:


Patient is well-developed and well-nourished.  Patient is nontoxic and well-

hydrated and is in no acute distress.





ENT:


Neck is soft and supple.  No significant lymphadenopathy is noted.  Oropharynx 

is clear.  Moist mucous membranes.  Neck has full range of motion without 

eliciting any pain.  





EYES:


The sclera were anicteric and conjunctiva were pink and moist.  Extraocular 

movements were intact and pupils were equal round and reactive to light.  

Eyelids were unremarkable.





PULMONARY:


Unlabored respirations.  Good breath sounds bilaterally.  No audible rales 

rhonchi or wheezing was noted.





CARDIOVASCULAR:


There is a regular rate and rhythm without any murmurs gallops or rubs.  





ABDOMEN:


Soft and nontender with normal bowel sounds. 





SKIN:


Skin is clear with no lesions or rashes and otherwise unremarkable.





NEUROLOGIC:


Patient is alert and oriented x3.  Cranial nerves II through XII are grossly 

intact.  Motor and sensory are also intact.  Normal speech, volume and content. 

Symmetrical smile.  





MUSCULOSKELETAL:


Normal extremities with adequate strength and full range of motion.  Lifting her

right arm caused her to have a sharp pain in the upper right chest.





LYMPHATICS:


No significant lymphadenopathy is noted





PSYCHIATRIC:


Normal psychiatric evaluation.  


Limitations: no limitations





Course


                                   Vital Signs











  06/16/22 06/16/22 06/16/22





  12:05 14:18 14:21


 


Pulse Rate 72 65 


 


Pulse Rate [   65





Cardiac Monitor   





]   


 


Respiratory 18 18 





Rate   


 


Blood Pressure 130/78 120/72 


 


O2 Sat by Pulse 100 98 





Oximetry   














Medical Decision Making





- Medical Decision Making





EKG shows sinus rhythm at 69 bpm NC interval 142 QRS is 90 QT interval 380 QTC 

is 408.  Patient's EKG shows no ST segment elevation or depression.





Chest x-ray shows no acute abnormality.





Patient did not want anything for the pain.





Disposition


Clinical Impression: 


 Chest wall pain





Disposition: HOME SELF-CARE


Condition: Good


Instructions (If sedation given, give patient instructions):  Chest Wall Pain 

(ED)


Additional Instructions: 


Patient should return if there is any shortness of breath or fever or increasing

pain.


Is patient prescribed a controlled substance at d/c from ED?: No


Referrals: 


Anila Arenas MD [Primary Care Provider] - 1-2 days


Time of Disposition: 14:33

## 2023-03-31 NOTE — CT
EXAMINATION TYPE: CT abdomen pelvis w con

 

DATE OF EXAM: 3/31/2023

 

COMPARISON: 7/3/2013

 

HISTORY: RLQ abdominal pain

 

CT DLP: 646.9 mGycm

Automated exposure control for dose reduction was used.

 

CONTRAST: 

Performed with IV Contrast, patient injected with 100cc mL of Isovue 300.

 

Images obtained from the diaphragm to the floor the pelvis with IV contrast.

 

Lung bases are clear. No pleural effusion. Heart size is normal. No pericardial effusion.

 

Liver spleen and stomach pancreas gallbladder appear intact. The bowel are not dilated.

 

There is no adrenal mass. Kidneys show satisfactory contrast opacification. No hydronephrosis. Ureter
s are not dilated. There is no retroperitoneal adenopathy. The bladder distends smoothly. No inguinal
 hernia. No free fluid in the pelvis. Uterus is anteverted. No sign of a pelvic mass.

 

The lumbar vertebra have normal spacing and alignment. Posterior element are intact. No compression f
racture. There is a tampon in the vagina.

 

No adnexal mass. There is no mesenteric edema. No ascites or free air. No sign of a bowel obstruction
. Terminal ileum appears normal. Appendix not seen. No sign of thickened appendix.

No mesenteric adenopathy.

IMPRESSION:

Appendix not seen. No acute abnormality of the abdomen and pelvis.

## 2023-03-31 NOTE — ED
Abdominal Pain HPI





- General


Source: patient, RN notes reviewed


Mode of arrival: ambulatory


Limitations: no limitations





<Emigdio Horton - Last Filed: 03/31/23 13:48>





<William Chapa - Last Filed: 04/01/23 13:48>





- General


Chief Complaint: Abdominal Pain


Stated Complaint: Abd Pain


Time Seen by Provider: 03/31/23 13:48





- History of Present Illness


Initial Comments: 


27-year-old female presented to the emergency Department from urgent care chief 

complaint right-sided abdominal pain.  Patient states started yesterday night 

states that pain has worsened and is worse with certain movements and positions.

 Patient was seen in urgent care and sent here for rule out appendicitis.


 (Emigdio Horton)


This is a 27-year-old female presents emergency Department complaining of right 

lower quadrant abdominal pain for one day now.  Patient states she has chronic 

diarrhea but that's not changed per patient denies any nausea vomiting.  Patient

denies any fever chills per patient denies any chest pain difficulty breathing 

shortest breath per patient denies any vaginal bleeding or discharge.  Patient 

states she has not been sexually active for at least 3 months.  Patient denies 

any dysuria hematuria urinary frequency.patient indicated to the triage nurse 

that she was having pain for 3 days she told me it started yesterday at 3:00 

(William Chapa)





- Related Data


                                Home Medications











 Medication  Instructions  Recorded  Confirmed


 


No Known Home Medications  04/25/19 04/25/19











                                    Allergies











Allergy/AdvReac Type Severity Reaction Status Date / Time


 


milk AdvReac  Unknown Verified 04/24/21 13:16














Review of Systems


ROS Other: All systems not noted in ROS Statement are negative.





<Emigdio Horton - Last Filed: 03/31/23 13:48>


ROS Other: All systems not noted in ROS Statement are negative.





<William Chapa - Last Filed: 04/01/23 13:48>


ROS Statement: 


Those systems with pertinent positive or pertinent negative responses have been 

documented in the HPI.








Past Medical History


Past Medical History: Asthma, GERD/Reflux


Additional Past Medical History / Comment(s): migraines, pneumomediastinum 2014.

 Pt has a history of drug and alcohol abuse since age 15, (whiskey, crystal 

meth, marijuana) pt went to rehab jan 2017, clean since that time per pt.


History of Any Multi-Drug Resistant Organisms: None Reported


Past Surgical History: Adenoidectomy, Tonsillectomy


Past Anesthesia/Blood Transfusion Reactions: Motion Sickness, No Reported 

Reaction


Past Psychological History: Anxiety, Panic Disorder


Smoking Status: Former smoker


Past Alcohol Use History: Abuse, None Reported


Past Drug Use History: Marijuana





- Past Family History


  ** Father


Family Medical History: No Reported History


Additional Family Medical History / Comment(s): FATHER IS HEALTHY.





  ** Mother


History Unknown: Yes


Family Medical History: Unable to Obtain


Additional Family Medical History / Comment(s): PT STATES SHE DOES NOT KNOW HER 

MOTHERS HISTORY.





<Emigdio Horton - Last Filed: 03/31/23 13:48>





General Exam


Limitations: no limitations





<Emigdio Horton - Last Filed: 03/31/23 13:48>





<William Chapa - Last Filed: 04/01/23 13:48>





- General Exam Comments


Initial Comments: 





Visual Physical Exam





Vital signs reviewed





General: Well-appearing, nontoxic, no acute distress.


Head: Normocephalic, atraumatic


Eyes: PERRLA, EOMI


ENT: Airway patent


Chest: Nonlabored breathing


Skin: No visual rash, normal skin tone


Neuro: Alert and oriented 3


Musculoskeletal: No gross abnormalities (Emigdio Horton)


GENERAL:


Patient is well-developed and well-nourished.  Patient is nontoxic and well-

hydrated and is in no acute distress.





ENT:


Neck is soft and supple.  No significant lymphadenopathy is noted.  Oropharynx 

is clear.  Moist mucous membranes.  Neck has full range of motion without 

eliciting any pain.  





EYES:


The sclera were anicteric and conjunctiva were pink and moist.  Extraocular 

movements were intact and pupils were equal round and reactive to light.  E

yelids were unremarkable.





PULMONARY:


Unlabored respirations.  Good breath sounds bilaterally.  No audible rales 

rhonchi or wheezing was noted.





CARDIOVASCULAR:


There is a regular rate and rhythm without any murmurs gallops or rubs.  





ABDOMEN:


Minimal right flank tenderness no rebound





SKIN:


Skin is clear with no lesions or rashes and otherwise unremarkable.





NEUROLOGIC:


Patient is alert and oriented x3.  Cranial nerves II through XII are grossly 

intact.  Motor and sensory are also intact.  Normal speech, volume and content. 

Symmetrical smile.  








MUSCULOSKELETAL:


Normal extremities with adequate strength and full range of motion.  





LYMPHATICS:


No significant lymphadenopathy is noted





PSYCHIATRIC:


Normal psychiatric evaluation.  [id number]


 (William Chapa)





Course


                                   Vital Signs











  03/31/23 03/31/23





  12:56 20:28


 


Temperature 98.4 F 


 


Pulse Rate 106 H 86


 


Respiratory 20 18





Rate  


 


Blood Pressure 150/72 116/76


 


O2 Sat by Pulse 99 98





Oximetry  














Medical Decision Making





- Lab Data


Result diagrams: 


                                 03/31/23 13:06





                                 03/31/23 13:06





<Emigdio Horton - Last Filed: 03/31/23 13:48>





- Lab Data


Result diagrams: 


                                 03/31/23 13:06





                                 03/31/23 13:06





<William Chapa - Last Filed: 04/01/23 13:48>





- Medical Decision Making





Was pt. sent in by a medical professional or institution (, PA, NP, urgent 

care, hospital, or nursing home...) When possible be specific


@  -No


Did you speak to anyone other than the patient for history (EMS, parent, family,

police, friend...)? What history was obtained from this source 


@  -No


Did you review nursing and triage notes (agree or disagree)?  Why? 


@  -I reviewed and agree with nursing and triage notes


Were old charts reviewed (outside hosp., previous admission, EMS record, old 

EKG, old radiological studies, urgent care reports/EKG's, nursing home records)?

Report findings 


@  -I reviewed prior laboratory studies and prior charts on this patient.


Differential Diagnosis (chest pain, altered mental status, abdominal pain women,

abdominal pain men, vaginal bleeding, weakness, fever, dyspnea, syncope, 

headache, dizziness, GI bleed, back pain, seizure, CVA, palpatations, mental 

health, musculoskeletal)? 


@  -Differential Abdominal Pain Women:


Appendicitis, Cholecystitis, diverticulosis, ischemic bowel, pancreatitis, 

hepatitis, UTI, gastroenteritis, AAA, incarcerated hernia, bowel obstruction, 

constipation, inflammatory bowel, hepatitis, peptic ulcer disease, splenic 

infarction, perforated viscus, vulvitis, ovarian torsion, PID, kidney stone, 

placenta abruption, this is not meant to be an all-inclusive list





EKG interpreted by me (3pts min.).


@  -As above


X-rays interpreted by me (1pt min.).


@  -None done


CT interpreted by me (1pt min.).


@  -CT of the abdomen was interpreted by myself as no acute abnormality.


U/S interpreted by me (1pt. min.).


@  -None done


What testing was considered but not performed or refused? (CT, X-rays, U/S, 

labs)? Why?


@  -None


What meds were considered but not given or refused? Why?


@  -None


Did you discuss the management of the patient with other professionals 

(professionals i.e. Dr., PA, NP, lab, RT, psych nurse, , , 

teacher, , )? Give summary


@  -No


Was smoking cessation discussed for >3mins.?


@  -No


Was critical care preformed (if so, how long)?


@  -No


Were there social determinants of health that impacted care today? How? 

(Homelessness, low income, unemployed, alcoholism, drug addiction, 

transportation, low edu. Level, literacy, decrease access to med. care, senior care, 

rehab)?


@  -No


Was there de-escalation of care discussed even if they declined (Discuss DNR or 

withdrawal of care, Hospice)? DNR status


@  -No


What co-morbidities impacted this encounter? (DM, HTN, Smoking, COPD, CAD, Can

cer, CVA, ARF, Chemo, Hep., AIDS, mental health diagnosis, sleep apnea, morbid 

obesity)?


@  -None


Was patient admitted / discharged? Hospital course, mention meds given and 

route, prescriptions, significant lab abnormalities, going to OR and other 

pertinent info.


@  -She came in with abdominal pain was very slightly tender on palpation no nader

ound or guarding.  Labs were done that showed no significant abnormality.  

Patient had a CAT scan showed no acute abnormality.


Undiagnosed new problem with uncertain prognosis?


@  -No


Drug Therapy requiring intensive monitoring for toxicity (Heparin, Nitro, 

Insulin, Cardizem)?


@  -No


Were any procedures done?


@  -No


Diagnosis/symptom?


@  -Abdominal pain


Acute, or Chronic, or Acute on Chronic?


@  -Acute on chronic


Uncomplicated (without systemic symptoms) or Complicated (systemic symptoms)?


@  -Uncomplicated


Side effects of treatment?


@  -No


Exacerbation, Progression, or Severe Exacerbation?


@  -No


Poses a threat to life or bodily function? How? (Chest pain, USA, MI, pneumonia,

PE, COPD, DKA, ARF, appy, cholecystitis, CVA, Diverticulitis, Homicidal, 

Suicidal, threat to staff... and all critical care pts)


@  -No (William Chapa)





- Lab Data


                                   Lab Results











  03/31/23 03/31/23 03/31/23 Range/Units





  13:06 13:06 13:06 


 


WBC  12.7 H    (3.8-10.6)  k/uL


 


RBC  4.75    (3.80-5.40)  m/uL


 


Hgb  14.8    (11.4-16.0)  gm/dL


 


Hct  44.0    (34.0-46.0)  %


 


MCV  92.7    (80.0-100.0)  fL


 


MCH  31.1    (25.0-35.0)  pg


 


MCHC  33.6    (31.0-37.0)  g/dL


 


RDW  12.1    (11.5-15.5)  %


 


Plt Count  297    (150-450)  k/uL


 


MPV  7.1    


 


Neutrophils %  79    %


 


Lymphocytes %  15    %


 


Monocytes %  4    %


 


Eosinophils %  1    %


 


Basophils %  1    %


 


Neutrophils #  10.0 H    (1.3-7.7)  k/uL


 


Lymphocytes #  1.9    (1.0-4.8)  k/uL


 


Monocytes #  0.5    (0-1.0)  k/uL


 


Eosinophils #  0.1    (0-0.7)  k/uL


 


Basophils #  0.1    (0-0.2)  k/uL


 


Sodium     (137-145)  mmol/L


 


Potassium     (3.5-5.1)  mmol/L


 


Chloride     ()  mmol/L


 


Carbon Dioxide     (22-30)  mmol/L


 


Anion Gap     mmol/L


 


BUN     (7-17)  mg/dL


 


Creatinine     (0.52-1.04)  mg/dL


 


Est GFR (CKD-EPI)AfAm     (>60 ml/min/1.73 sqM)  


 


Est GFR (CKD-EPI)NonAf     (>60 ml/min/1.73 sqM)  


 


Glucose     (74-99)  mg/dL


 


Calcium     (8.4-10.2)  mg/dL


 


Total Bilirubin     (0.2-1.3)  mg/dL


 


AST     (14-36)  U/L


 


ALT     (4-34)  U/L


 


Alkaline Phosphatase     ()  U/L


 


Total Protein     (6.3-8.2)  g/dL


 


Albumin     (3.5-5.0)  g/dL


 


Amylase     ()  U/L


 


Lipase     ()  U/L


 


Urine Color   Light Yellow   


 


Urine Appearance   Clear   (Clear)  


 


Urine pH   6.5   (5.0-8.0)  


 


Ur Specific Gravity   1.007   (1.001-1.035)  


 


Urine Protein   Negative   (Negative)  


 


Urine Glucose (UA)   Negative   (Negative)  


 


Urine Ketones   Negative   (Negative)  


 


Urine Blood   Negative   (Negative)  


 


Urine Nitrite   Negative   (Negative)  


 


Urine Bilirubin   Negative   (Negative)  


 


Urine Urobilinogen   <2.0   (<2.0)  mg/dL


 


Ur Leukocyte Esterase   Negative   (Negative)  


 


Urine HCG, Qual    Not Detected  (Not Detectd)  














  03/31/23 Range/Units





  13:06 


 


WBC   (3.8-10.6)  k/uL


 


RBC   (3.80-5.40)  m/uL


 


Hgb   (11.4-16.0)  gm/dL


 


Hct   (34.0-46.0)  %


 


MCV   (80.0-100.0)  fL


 


MCH   (25.0-35.0)  pg


 


MCHC   (31.0-37.0)  g/dL


 


RDW   (11.5-15.5)  %


 


Plt Count   (150-450)  k/uL


 


MPV   


 


Neutrophils %   %


 


Lymphocytes %   %


 


Monocytes %   %


 


Eosinophils %   %


 


Basophils %   %


 


Neutrophils #   (1.3-7.7)  k/uL


 


Lymphocytes #   (1.0-4.8)  k/uL


 


Monocytes #   (0-1.0)  k/uL


 


Eosinophils #   (0-0.7)  k/uL


 


Basophils #   (0-0.2)  k/uL


 


Sodium  139  (137-145)  mmol/L


 


Potassium  3.9  (3.5-5.1)  mmol/L


 


Chloride  104  ()  mmol/L


 


Carbon Dioxide  24  (22-30)  mmol/L


 


Anion Gap  11  mmol/L


 


BUN  5 L  (7-17)  mg/dL


 


Creatinine  0.63  (0.52-1.04)  mg/dL


 


Est GFR (CKD-EPI)AfAm  >90  (>60 ml/min/1.73 sqM)  


 


Est GFR (CKD-EPI)NonAf  >90  (>60 ml/min/1.73 sqM)  


 


Glucose  90  (74-99)  mg/dL


 


Calcium  9.1  (8.4-10.2)  mg/dL


 


Total Bilirubin  0.8  (0.2-1.3)  mg/dL


 


AST  29  (14-36)  U/L


 


ALT  37 H  (4-34)  U/L


 


Alkaline Phosphatase  58  ()  U/L


 


Total Protein  7.6  (6.3-8.2)  g/dL


 


Albumin  4.7  (3.5-5.0)  g/dL


 


Amylase  83  ()  U/L


 


Lipase  133  ()  U/L


 


Urine Color   


 


Urine Appearance   (Clear)  


 


Urine pH   (5.0-8.0)  


 


Ur Specific Gravity   (1.001-1.035)  


 


Urine Protein   (Negative)  


 


Urine Glucose (UA)   (Negative)  


 


Urine Ketones   (Negative)  


 


Urine Blood   (Negative)  


 


Urine Nitrite   (Negative)  


 


Urine Bilirubin   (Negative)  


 


Urine Urobilinogen   (<2.0)  mg/dL


 


Ur Leukocyte Esterase   (Negative)  


 


Urine HCG, Qual   (Not Detectd)  














Disposition





<Emigdio Horton - Last Filed: 03/31/23 13:48>


Is patient prescribed a controlled substance at d/c from ED?: No


Time of Disposition: 20:25





<William Chapa - Last Filed: 04/01/23 13:48>


Clinical Impression: 


 Abdominal pain





Disposition: HOME SELF-CARE


Condition: Good


Instructions (If sedation given, give patient instructions):  Abdominal Pain 

(ED)


Referrals: 


Anila Arenas MD [Primary Care Provider] - 1-2 days

## 2023-06-15 NOTE — US
EXAMINATION TYPE: US thyroid st tissue head/neck

 

DATE OF EXAM: 6/14/2023

 

COMPARISON: NONE

 

CLINICAL INDICATION: Female, 28 years old with history of R13.10 DYSPHAGIA, UNSPECIFIED; Dysphagia

 

GLAND SIZE:

 

Right Lobe: 4.7 x 1.8 x 1.5 cm

** Overall Parenchyma:  homogenous

Left Lobe: 4.6 x 1.6 x 1.4 cm

** Overall Parenchyma:  homogeneous

Isthmus Thickness:  0.27 cm

 

NODULES

 

RIGHT:   # of nodules measured on right: 0

 

LEFT:    # of nodules measured on left:  1

1.  0.23 X 0.3  x 0.2 cm, lower mid, cystic or almost completely cystic, hypoechoic nodule, which is 
wider than tall, with smooth margins, without echogenic foci.

 

ISTHMUS:    # of nodules measured in the isthmus:  0

 

Bilateral neck scanned, no evidence of lymphadenopathy.

 

 

 

IMPRESSION:

Subcentimeter cystic nodule left thyroid lobe.

## 2024-02-14 NOTE — XR
Supine and upright abdomen.

 

DATE: 13 2024.

 

COMPARISON: None available.

 

MEDICAL HISTORY: Blood in stool and constipation.

 

IMPRESSION:

 

Bowel gas pattern appears nonobstructive. There does not appear to be a large amount of stool through
out the colon.

 

There is no free intraperitoneal air.

 

No abnormal mass effect or suspicious calcifications are seen.